# Patient Record
Sex: FEMALE | Race: WHITE | NOT HISPANIC OR LATINO | URBAN - METROPOLITAN AREA
[De-identification: names, ages, dates, MRNs, and addresses within clinical notes are randomized per-mention and may not be internally consistent; named-entity substitution may affect disease eponyms.]

---

## 2021-10-21 ENCOUNTER — *OTHER (OUTPATIENT)
Dept: URBAN - METROPOLITAN AREA 40 | Facility: CLINIC | Age: 71
Setting detail: DERMATOLOGY
End: 2021-10-21

## 2021-10-21 DIAGNOSIS — C44.529 SQUAMOUS CELL CARCINOMA OF SKIN OF OTHER PART OF TRUNK: ICD-10-CM

## 2021-10-21 PROCEDURE — 11102 TANGNTL BX SKIN SINGLE LES: CPT

## 2021-10-21 PROCEDURE — 99213 OFFICE O/P EST LOW 20 MIN: CPT

## 2021-12-15 ENCOUNTER — BCC (OUTPATIENT)
Dept: URBAN - METROPOLITAN AREA CLINIC 27 | Facility: CLINIC | Age: 71
Setting detail: DERMATOLOGY
End: 2021-12-15

## 2021-12-15 DIAGNOSIS — D48.5 NEOPLASM OF UNCERTAIN BEHAVIOR OF SKIN: ICD-10-CM

## 2021-12-15 PROBLEM — C44.719 BASAL CELL CARCINOMA OF SKIN OF LEFT LOWER LIMB, INCLUDING HIP: Status: ACTIVE | Noted: 2021-12-15

## 2021-12-15 PROBLEM — C44.719 BASAL CELL CARCINOMA OF SKIN OF LEFT LOWER LIMB, INCLUDING HIP: Status: RESOLVED | Noted: 2021-12-15

## 2021-12-15 PROCEDURE — 99214 OFFICE O/P EST MOD 30 MIN: CPT

## 2021-12-15 PROCEDURE — 12001 RPR S/N/AX/GEN/TRNK 2.5CM/<: CPT

## 2021-12-15 PROCEDURE — 17313 MOHS 1 STAGE T/A/L: CPT

## 2022-02-16 ENCOUNTER — *OTHER (OUTPATIENT)
Dept: URBAN - METROPOLITAN AREA 40 | Facility: CLINIC | Age: 72
Setting detail: DERMATOLOGY
End: 2022-02-16

## 2022-02-16 ENCOUNTER — RX ONLY (RX ONLY)
Age: 72
End: 2022-02-16

## 2022-02-16 DIAGNOSIS — D04.9 CARCINOMA IN SITU OF SKIN, UNSPECIFIED: ICD-10-CM

## 2022-02-16 DIAGNOSIS — D04.5 CARCINOMA IN SITU OF SKIN OF TRUNK: ICD-10-CM

## 2022-02-16 DIAGNOSIS — C44.511 BASAL CELL CARCINOMA OF SKIN OF BREAST: ICD-10-CM

## 2022-02-16 DIAGNOSIS — C44.510 BASAL CELL CARCINOMA OF ANAL SKIN: ICD-10-CM

## 2022-02-16 DIAGNOSIS — C44.519 BASAL CELL CARCINOMA OF SKIN OF OTHER PART OF TRUNK: ICD-10-CM

## 2022-02-16 DIAGNOSIS — C44.41 BASAL CELL CARCINOMA OF SKIN OF SCALP AND NECK: ICD-10-CM

## 2022-02-16 PROCEDURE — 99214 OFFICE O/P EST MOD 30 MIN: CPT

## 2022-04-14 ENCOUNTER — *OTHER (OUTPATIENT)
Dept: URBAN - METROPOLITAN AREA 40 | Facility: CLINIC | Age: 72
Setting detail: DERMATOLOGY
End: 2022-04-14

## 2022-04-14 DIAGNOSIS — L57.9 SKIN CHANGES DUE TO CHRONIC EXPOSURE TO NONIONIZING RADIATION, UNSPECIFIED: ICD-10-CM

## 2022-04-14 DIAGNOSIS — L90.5 SCAR CONDITIONS AND FIBROSIS OF SKIN: ICD-10-CM

## 2022-04-14 DIAGNOSIS — L82.1 OTHER SEBORRHEIC KERATOSIS: ICD-10-CM

## 2022-04-14 DIAGNOSIS — D23.5 OTHER BENIGN NEOPLASM OF SKIN OF TRUNK: ICD-10-CM

## 2022-04-14 DIAGNOSIS — Z85.820 PERSONAL HISTORY OF MALIGNANT MELANOMA OF SKIN: ICD-10-CM

## 2022-04-14 DIAGNOSIS — Z85.828 PERSONAL HISTORY OF OTHER MALIGNANT NEOPLASM OF SKIN: ICD-10-CM

## 2022-04-14 DIAGNOSIS — D23.39 OTHER BENIGN NEOPLASM OF SKIN OF OTHER PARTS OF FACE: ICD-10-CM

## 2022-04-14 DIAGNOSIS — L81.2 FRECKLES: ICD-10-CM

## 2022-04-14 DIAGNOSIS — D23.4 OTHER BENIGN NEOPLASM OF SKIN OF SCALP AND NECK: ICD-10-CM

## 2022-04-14 PROCEDURE — 99213 OFFICE O/P EST LOW 20 MIN: CPT

## 2022-05-27 ENCOUNTER — W/C (OUTPATIENT)
Dept: URBAN - METROPOLITAN AREA CLINIC 27 | Facility: CLINIC | Age: 72
Setting detail: DERMATOLOGY
End: 2022-05-27

## 2022-05-27 DIAGNOSIS — L57.0 ACTINIC KERATOSIS: ICD-10-CM

## 2022-05-27 PROCEDURE — 99213 OFFICE O/P EST LOW 20 MIN: CPT

## 2022-07-13 ENCOUNTER — APPOINTMENT (OUTPATIENT)
Dept: URBAN - METROPOLITAN AREA CLINIC 194 | Age: 72
Setting detail: DERMATOLOGY
End: 2022-07-13

## 2022-07-13 VITALS — RESPIRATION RATE: 18 BRPM | HEART RATE: 96 BPM | SYSTOLIC BLOOD PRESSURE: 160 MMHG | DIASTOLIC BLOOD PRESSURE: 52 MMHG

## 2022-07-13 VITALS
SYSTOLIC BLOOD PRESSURE: 162 MMHG | DIASTOLIC BLOOD PRESSURE: 42 MMHG | TEMPERATURE: 98.2 F | WEIGHT: 170 LBS | HEIGHT: 65 IN | HEART RATE: 104 BPM | RESPIRATION RATE: 20 BRPM

## 2022-07-13 DIAGNOSIS — Z85.828 PERSONAL HISTORY OF OTHER MALIGNANT NEOPLASM OF SKIN: ICD-10-CM

## 2022-07-13 DIAGNOSIS — Z48.817 ENCOUNTER FOR SURGICAL AFTERCARE FOLLOWING SURGERY ON THE SKIN AND SUBCUTANEOUS TISSUE: ICD-10-CM

## 2022-07-13 DIAGNOSIS — L57.8 OTHER SKIN CHANGES DUE TO CHRONIC EXPOSURE TO NONIONIZING RADIATION: ICD-10-CM

## 2022-07-13 DIAGNOSIS — Z12.83 ENCOUNTER FOR SCREENING FOR MALIGNANT NEOPLASM OF SKIN: ICD-10-CM

## 2022-07-13 PROCEDURE — 99213 OFFICE O/P EST LOW 20 MIN: CPT

## 2022-07-13 PROCEDURE — OTHER MIPS QUALITY: OTHER

## 2022-07-13 PROCEDURE — OTHER COUNSELING: OTHER

## 2022-07-13 PROCEDURE — OTHER SURGICAL DECISION MAKING: OTHER

## 2022-07-13 PROCEDURE — OTHER ASC CONSULTATION: OTHER

## 2022-07-13 NOTE — PROCEDURE: COUNSELING
Detail Level: Detailed
Patient Specific Counseling (Will Not Stick From Patient To Patient): Patient instructed to return to daily bandage changes. Patient is to wear support hoses to help reduce the swelling. Patient should keep leg elevated as much as possible to help with healing.

## 2022-08-26 ENCOUNTER — APPOINTMENT (OUTPATIENT)
Dept: URBAN - METROPOLITAN AREA CLINIC 194 | Age: 72
Setting detail: DERMATOLOGY
End: 2022-08-29

## 2022-08-26 VITALS — HEIGHT: 65 IN | WEIGHT: 170 LBS

## 2022-08-26 VITALS
DIASTOLIC BLOOD PRESSURE: 70 MMHG | RESPIRATION RATE: 18 BRPM | WEIGHT: 170 LBS | TEMPERATURE: 97.2 F | SYSTOLIC BLOOD PRESSURE: 138 MMHG | HEART RATE: 72 BPM | HEIGHT: 65 IN

## 2022-08-26 VITALS — DIASTOLIC BLOOD PRESSURE: 72 MMHG | HEART RATE: 74 BPM | RESPIRATION RATE: 18 BRPM | SYSTOLIC BLOOD PRESSURE: 140 MMHG

## 2022-08-26 DIAGNOSIS — Z85.828 PERSONAL HISTORY OF OTHER MALIGNANT NEOPLASM OF SKIN: ICD-10-CM

## 2022-08-26 DIAGNOSIS — Z48.817 ENCOUNTER FOR SURGICAL AFTERCARE FOLLOWING SURGERY ON THE SKIN AND SUBCUTANEOUS TISSUE: ICD-10-CM

## 2022-08-26 DIAGNOSIS — Z12.83 ENCOUNTER FOR SCREENING FOR MALIGNANT NEOPLASM OF SKIN: ICD-10-CM

## 2022-08-26 PROCEDURE — OTHER ASC CONSULTATION: OTHER

## 2022-08-26 PROCEDURE — OTHER SURGICAL DECISION MAKING: OTHER

## 2022-08-26 PROCEDURE — OTHER PRESCRIPTION: OTHER

## 2022-08-26 PROCEDURE — OTHER MIPS QUALITY: OTHER

## 2022-08-26 PROCEDURE — OTHER COUNSELING: OTHER

## 2022-08-26 PROCEDURE — 99213 OFFICE O/P EST LOW 20 MIN: CPT

## 2022-08-26 RX ORDER — TRIAMCINOLONE ACETONIDE 1 MG/G
OINTMENT TOPICAL
Qty: 15 | Refills: 0 | Status: ACTIVE

## 2022-10-13 ENCOUNTER — APPOINTMENT (OUTPATIENT)
Dept: URBAN - METROPOLITAN AREA CLINIC 199 | Age: 72
Setting detail: DERMATOLOGY
End: 2022-10-27

## 2022-10-13 DIAGNOSIS — D22 MELANOCYTIC NEVI: ICD-10-CM

## 2022-10-13 DIAGNOSIS — Z85.828 PERSONAL HISTORY OF OTHER MALIGNANT NEOPLASM OF SKIN: ICD-10-CM

## 2022-10-13 DIAGNOSIS — L81.4 OTHER MELANIN HYPERPIGMENTATION: ICD-10-CM

## 2022-10-13 DIAGNOSIS — L57.0 ACTINIC KERATOSIS: ICD-10-CM

## 2022-10-13 DIAGNOSIS — Z48.817 ENCOUNTER FOR SURGICAL AFTERCARE FOLLOWING SURGERY ON THE SKIN AND SUBCUTANEOUS TISSUE: ICD-10-CM

## 2022-10-13 DIAGNOSIS — L24 IRRITANT CONTACT DERMATITIS: ICD-10-CM

## 2022-10-13 PROBLEM — L24.9 IRRITANT CONTACT DERMATITIS, UNSPECIFIED CAUSE: Status: ACTIVE | Noted: 2022-10-13

## 2022-10-13 PROBLEM — D22.39 MELANOCYTIC NEVI OF OTHER PARTS OF FACE: Status: ACTIVE | Noted: 2022-10-13

## 2022-10-13 PROCEDURE — OTHER COUNSELING: OTHER

## 2022-10-13 PROCEDURE — OTHER PRESCRIPTION MEDICATION MANAGEMENT: OTHER

## 2022-10-13 PROCEDURE — 99214 OFFICE O/P EST MOD 30 MIN: CPT | Mod: 25

## 2022-10-13 PROCEDURE — OTHER PRESCRIPTION: OTHER

## 2022-10-13 PROCEDURE — 17000 DESTRUCT PREMALG LESION: CPT

## 2022-10-13 PROCEDURE — OTHER LIQUID NITROGEN: OTHER

## 2022-10-13 RX ORDER — MUPIROCIN 20 MG/G
OINTMENT TOPICAL
Qty: 22 | Refills: 2 | Status: ERX | COMMUNITY
Start: 2022-10-13

## 2022-10-13 ASSESSMENT — LOCATION ZONE DERM
LOCATION ZONE: FACE
LOCATION ZONE: LEG

## 2022-10-13 ASSESSMENT — LOCATION DETAILED DESCRIPTION DERM
LOCATION DETAILED: RIGHT CENTRAL MALAR CHEEK
LOCATION DETAILED: LEFT DISTAL PRETIBIAL REGION
LOCATION DETAILED: LEFT INFERIOR CENTRAL MALAR CHEEK
LOCATION DETAILED: LEFT LATERAL MALAR CHEEK
LOCATION DETAILED: RIGHT MEDIAL MALAR CHEEK
LOCATION DETAILED: LEFT MEDIAL MALAR CHEEK
LOCATION DETAILED: LEFT SUPERIOR NASAL CHEEK

## 2022-10-13 ASSESSMENT — LOCATION SIMPLE DESCRIPTION DERM
LOCATION SIMPLE: LEFT CHEEK
LOCATION SIMPLE: RIGHT CHEEK
LOCATION SIMPLE: LEFT PRETIBIAL REGION

## 2022-10-13 NOTE — PROCEDURE: LIQUID NITROGEN
Number Of Freeze-Thaw Cycles: 3 freeze-thaw cycles
Detail Level: Detailed
Render Note In Bullet Format When Appropriate: No
Post-Care Instructions: I reviewed with the patient in detail post-care instructions. Patient is to wear sunprotection, and avoid picking at any of the treated lesions. Pt may apply Vaseline to crusted or scabbing areas.
Duration Of Freeze Thaw-Cycle (Seconds): 5
Show Aperture Variable?: Yes
Consent: The patient's consent was obtained including but not limited to risks of crusting, scabbing, blistering, scarring, darker or lighter pigmentary change, recurrence, incomplete removal and infection.

## 2022-10-13 NOTE — PROCEDURE: PRESCRIPTION MEDICATION MANAGEMENT
Render In Strict Bullet Format?: No
Plan: Ongoing since mohs surgery. Likely ICD v ACD to adhesive bandages\\n-some honey crust on site today, will treat with mupirocin ointment TID x 1 month\\n-reviewed that other than the above, to use only vaseline and non stick dressing over top the entire area\\n-if inflammation not resolved at next visit with conservative measures, will prescribe triam ointment
Detail Level: Zone

## 2022-11-14 ENCOUNTER — APPOINTMENT (OUTPATIENT)
Dept: URBAN - METROPOLITAN AREA CLINIC 199 | Age: 72
Setting detail: DERMATOLOGY
End: 2022-11-14

## 2022-11-14 DIAGNOSIS — L81.4 OTHER MELANIN HYPERPIGMENTATION: ICD-10-CM

## 2022-11-14 DIAGNOSIS — L82.1 OTHER SEBORRHEIC KERATOSIS: ICD-10-CM

## 2022-11-14 DIAGNOSIS — Z85.828 PERSONAL HISTORY OF OTHER MALIGNANT NEOPLASM OF SKIN: ICD-10-CM

## 2022-11-14 DIAGNOSIS — Z48.817 ENCOUNTER FOR SURGICAL AFTERCARE FOLLOWING SURGERY ON THE SKIN AND SUBCUTANEOUS TISSUE: ICD-10-CM

## 2022-11-14 DIAGNOSIS — L24 IRRITANT CONTACT DERMATITIS: ICD-10-CM

## 2022-11-14 PROBLEM — L24.9 IRRITANT CONTACT DERMATITIS, UNSPECIFIED CAUSE: Status: ACTIVE | Noted: 2022-11-14

## 2022-11-14 PROCEDURE — 99214 OFFICE O/P EST MOD 30 MIN: CPT

## 2022-11-14 PROCEDURE — OTHER COUNSELING: OTHER

## 2022-11-14 PROCEDURE — OTHER PRESCRIPTION MEDICATION MANAGEMENT: OTHER

## 2022-11-14 PROCEDURE — OTHER PRESCRIPTION: OTHER

## 2022-11-14 RX ORDER — CLOBETASOL PROPIONATE 0.5 MG/G
OINTMENT TOPICAL
Qty: 60 | Refills: 0 | Status: ERX | COMMUNITY
Start: 2022-11-14

## 2022-11-14 ASSESSMENT — LOCATION DETAILED DESCRIPTION DERM
LOCATION DETAILED: RIGHT CENTRAL MALAR CHEEK
LOCATION DETAILED: LEFT DISTAL PRETIBIAL REGION
LOCATION DETAILED: NASAL DORSUM
LOCATION DETAILED: LEFT INFERIOR CENTRAL MALAR CHEEK

## 2022-11-14 ASSESSMENT — LOCATION SIMPLE DESCRIPTION DERM
LOCATION SIMPLE: LEFT CHEEK
LOCATION SIMPLE: LEFT PRETIBIAL REGION
LOCATION SIMPLE: NOSE
LOCATION SIMPLE: RIGHT CHEEK

## 2022-11-14 ASSESSMENT — LOCATION ZONE DERM
LOCATION ZONE: LEG
LOCATION ZONE: FACE
LOCATION ZONE: NOSE

## 2022-11-14 NOTE — PROCEDURE: PRESCRIPTION MEDICATION MANAGEMENT
Detail Level: Zone
Render In Strict Bullet Format?: No
Plan: Surrounding prior mohs site. Mohs site itself is healing well but there is erythema and scale surrounding the site. Appears slightly less inflamed than last month but this has been occurring x 11 months. She continues to use vaseline under non-stick pad (looked at her wound care materials today)\\n-advised clobetasol ointment BID x 2 weeks\\n-rtc 2 weeks for eval

## 2022-11-30 ENCOUNTER — APPOINTMENT (OUTPATIENT)
Dept: URBAN - METROPOLITAN AREA CLINIC 199 | Age: 72
Setting detail: DERMATOLOGY
End: 2022-11-30

## 2022-11-30 DIAGNOSIS — Z48.817 ENCOUNTER FOR SURGICAL AFTERCARE FOLLOWING SURGERY ON THE SKIN AND SUBCUTANEOUS TISSUE: ICD-10-CM

## 2022-11-30 DIAGNOSIS — L24 IRRITANT CONTACT DERMATITIS: ICD-10-CM

## 2022-11-30 DIAGNOSIS — Z85.828 PERSONAL HISTORY OF OTHER MALIGNANT NEOPLASM OF SKIN: ICD-10-CM

## 2022-11-30 PROBLEM — L24.9 IRRITANT CONTACT DERMATITIS, UNSPECIFIED CAUSE: Status: ACTIVE | Noted: 2022-11-30

## 2022-11-30 PROCEDURE — OTHER PRESCRIPTION MEDICATION MANAGEMENT: OTHER

## 2022-11-30 PROCEDURE — 99213 OFFICE O/P EST LOW 20 MIN: CPT

## 2022-11-30 PROCEDURE — OTHER COUNSELING: OTHER

## 2022-11-30 ASSESSMENT — LOCATION SIMPLE DESCRIPTION DERM: LOCATION SIMPLE: LEFT PRETIBIAL REGION

## 2022-11-30 ASSESSMENT — LOCATION DETAILED DESCRIPTION DERM: LOCATION DETAILED: LEFT DISTAL PRETIBIAL REGION

## 2022-11-30 ASSESSMENT — LOCATION ZONE DERM: LOCATION ZONE: LEG

## 2022-11-30 NOTE — PROCEDURE: PRESCRIPTION MEDICATION MANAGEMENT
Render In Strict Bullet Format?: No
Plan: Improving significantly after many months of erythema and irritation, likely 2/2 bandages\\n-pause clobetasol, cont vaseline\\n-can use clobetasol as spot treatment on the small red papules that come and go
Detail Level: Zone

## 2023-01-04 ENCOUNTER — APPOINTMENT (OUTPATIENT)
Dept: URBAN - METROPOLITAN AREA CLINIC 199 | Age: 73
Setting detail: DERMATOLOGY
End: 2023-01-04

## 2023-01-04 DIAGNOSIS — Z85.828 PERSONAL HISTORY OF OTHER MALIGNANT NEOPLASM OF SKIN: ICD-10-CM

## 2023-01-04 DIAGNOSIS — L30.1 DYSHIDROSIS [POMPHOLYX]: ICD-10-CM

## 2023-01-04 DIAGNOSIS — L57.0 ACTINIC KERATOSIS: ICD-10-CM

## 2023-01-04 DIAGNOSIS — Z48.817 ENCOUNTER FOR SURGICAL AFTERCARE FOLLOWING SURGERY ON THE SKIN AND SUBCUTANEOUS TISSUE: ICD-10-CM

## 2023-01-04 DIAGNOSIS — L24 IRRITANT CONTACT DERMATITIS: ICD-10-CM

## 2023-01-04 PROBLEM — L24.9 IRRITANT CONTACT DERMATITIS, UNSPECIFIED CAUSE: Status: ACTIVE | Noted: 2023-01-04

## 2023-01-04 PROCEDURE — 17000 DESTRUCT PREMALG LESION: CPT

## 2023-01-04 PROCEDURE — OTHER PRESCRIPTION: OTHER

## 2023-01-04 PROCEDURE — OTHER COUNSELING: OTHER

## 2023-01-04 PROCEDURE — 99214 OFFICE O/P EST MOD 30 MIN: CPT | Mod: 25

## 2023-01-04 PROCEDURE — OTHER PRESCRIPTION MEDICATION MANAGEMENT: OTHER

## 2023-01-04 PROCEDURE — OTHER LIQUID NITROGEN: OTHER

## 2023-01-04 RX ORDER — MOMETASONE FUROATE 1 MG/G
CREAM TOPICAL
Qty: 45 | Refills: 2 | Status: ERX | COMMUNITY
Start: 2023-01-04

## 2023-01-04 ASSESSMENT — LOCATION ZONE DERM
LOCATION ZONE: FINGER
LOCATION ZONE: LEG
LOCATION ZONE: FACE

## 2023-01-04 ASSESSMENT — LOCATION DETAILED DESCRIPTION DERM
LOCATION DETAILED: LEFT DISTAL PRETIBIAL REGION
LOCATION DETAILED: LEFT INDEX FINGER PROXIMAL INTERPHALANGEAL JOINT
LOCATION DETAILED: RIGHT CENTRAL MALAR CHEEK

## 2023-01-04 ASSESSMENT — LOCATION SIMPLE DESCRIPTION DERM
LOCATION SIMPLE: RIGHT CHEEK
LOCATION SIMPLE: LEFT PRETIBIAL REGION
LOCATION SIMPLE: LEFT INDEX FINGER

## 2023-01-04 NOTE — PROCEDURE: LIQUID NITROGEN
Show Aperture Variable?: Yes
Render Note In Bullet Format When Appropriate: No
Duration Of Freeze Thaw-Cycle (Seconds): 5
Post-Care Instructions: I reviewed with the patient in detail post-care instructions. Patient is to wear sunprotection, and avoid picking at any of the treated lesions. Pt may apply Vaseline to crusted or scabbing areas.
Detail Level: Detailed
Number Of Freeze-Thaw Cycles: 3 freeze-thaw cycles
Consent: The patient's consent was obtained including but not limited to risks of crusting, scabbing, blistering, scarring, darker or lighter pigmentary change, recurrence, incomplete removal and infection.

## 2023-01-04 NOTE — PROCEDURE: COUNSELING
Detail Level: Detailed
Patient Specific Counseling (Will Not Stick From Patient To Patient): Nearly completely healed! Great progress. Cont moisturization with vaseline/moisturizer but otherwise does not need to keep bandage over site (this was causing her ICD/ACD)
Patient Specific Counseling (Will Not Stick From Patient To Patient): Resolved! At last visit was told to stop clobetasol and cont only vaseline. There is PIH from prior inflammation but no active rash today. Cont conservative care with vaseline/moisturization. No need for topical steroids

## 2023-01-04 NOTE — PROCEDURE: PRESCRIPTION MEDICATION MANAGEMENT
Plan: Flaring now; always flares in the winter. She likes mometasone\\n-start mometasone cream BID PRN flares
Render In Strict Bullet Format?: No
Detail Level: Zone

## 2023-05-31 ENCOUNTER — APPOINTMENT (OUTPATIENT)
Dept: URBAN - METROPOLITAN AREA CLINIC 199 | Age: 73
Setting detail: DERMATOLOGY
End: 2023-05-31

## 2023-05-31 DIAGNOSIS — L81.4 OTHER MELANIN HYPERPIGMENTATION: ICD-10-CM

## 2023-05-31 DIAGNOSIS — D485 NEOPLASM OF UNCERTAIN BEHAVIOR OF SKIN: ICD-10-CM

## 2023-05-31 DIAGNOSIS — L82.1 OTHER SEBORRHEIC KERATOSIS: ICD-10-CM

## 2023-05-31 DIAGNOSIS — D22 MELANOCYTIC NEVI: ICD-10-CM

## 2023-05-31 DIAGNOSIS — D18.0 HEMANGIOMA: ICD-10-CM

## 2023-05-31 DIAGNOSIS — Z85.828 PERSONAL HISTORY OF OTHER MALIGNANT NEOPLASM OF SKIN: ICD-10-CM

## 2023-05-31 DIAGNOSIS — T07XXXA ABRASION OR FRICTION BURN OF OTHER, MULTIPLE, AND UNSPECIFIED SITES, WITHOUT MENTION OF INFECTION: ICD-10-CM

## 2023-05-31 PROBLEM — D22.39 MELANOCYTIC NEVI OF OTHER PARTS OF FACE: Status: ACTIVE | Noted: 2023-05-31

## 2023-05-31 PROBLEM — D48.5 NEOPLASM OF UNCERTAIN BEHAVIOR OF SKIN: Status: ACTIVE | Noted: 2023-05-31

## 2023-05-31 PROBLEM — S00.402A UNSPECIFIED SUPERFICIAL INJURY OF LEFT EAR, INITIAL ENCOUNTER: Status: ACTIVE | Noted: 2023-05-31

## 2023-05-31 PROBLEM — D22.5 MELANOCYTIC NEVI OF TRUNK: Status: ACTIVE | Noted: 2023-05-31

## 2023-05-31 PROBLEM — D18.01 HEMANGIOMA OF SKIN AND SUBCUTANEOUS TISSUE: Status: ACTIVE | Noted: 2023-05-31

## 2023-05-31 PROCEDURE — OTHER COUNSELING: OTHER

## 2023-05-31 PROCEDURE — 11102 TANGNTL BX SKIN SINGLE LES: CPT

## 2023-05-31 PROCEDURE — 99213 OFFICE O/P EST LOW 20 MIN: CPT | Mod: 25

## 2023-05-31 PROCEDURE — OTHER BIOPSY BY SHAVE METHOD: OTHER

## 2023-05-31 ASSESSMENT — LOCATION DETAILED DESCRIPTION DERM
LOCATION DETAILED: RIGHT MEDIAL BREAST 1-2:00 REGION
LOCATION DETAILED: LEFT SUPERIOR LATERAL LOWER BACK
LOCATION DETAILED: RIGHT MEDIAL SUPERIOR CHEST
LOCATION DETAILED: LEFT ANTERIOR PROXIMAL THIGH
LOCATION DETAILED: RIGHT SUPERIOR UPPER BACK
LOCATION DETAILED: LEFT INFERIOR LATERAL LOWER BACK
LOCATION DETAILED: RIGHT LATERAL ABDOMEN
LOCATION DETAILED: LEFT SUPERIOR UPPER BACK
LOCATION DETAILED: LEFT LATERAL MALAR CHEEK
LOCATION DETAILED: NASAL DORSUM
LOCATION DETAILED: INFERIOR MID FOREHEAD
LOCATION DETAILED: LEFT SUPERIOR HELIX
LOCATION DETAILED: RIGHT INFERIOR FOREHEAD
LOCATION DETAILED: LEFT INFERIOR LATERAL NECK
LOCATION DETAILED: RIGHT MEDIAL MALAR CHEEK
LOCATION DETAILED: LEFT INFERIOR LATERAL MIDBACK
LOCATION DETAILED: RIGHT SUPERIOR LATERAL MIDBACK
LOCATION DETAILED: LEFT SUPERIOR CENTRAL MALAR CHEEK
LOCATION DETAILED: LEFT POSTERIOR SHOULDER
LOCATION DETAILED: RIGHT CLAVICULAR NECK
LOCATION DETAILED: SUPERIOR LUMBAR SPINE
LOCATION DETAILED: LEFT MEDIAL MALAR CHEEK
LOCATION DETAILED: LEFT DISTAL PRETIBIAL REGION
LOCATION DETAILED: LEFT INFERIOR CENTRAL MALAR CHEEK
LOCATION DETAILED: LEFT INFERIOR UPPER BACK
LOCATION DETAILED: EPIGASTRIC SKIN
LOCATION DETAILED: RIGHT SUPERIOR CENTRAL MALAR CHEEK
LOCATION DETAILED: LEFT MID-UPPER BACK
LOCATION DETAILED: RIGHT SUPERIOR ANTERIOR NECK
LOCATION DETAILED: LEFT LATERAL ABDOMEN
LOCATION DETAILED: RIGHT CENTRAL MALAR CHEEK
LOCATION DETAILED: RIGHT BUTTOCK
LOCATION DETAILED: LEFT FOREHEAD
LOCATION DETAILED: LEFT LATERAL FOREHEAD

## 2023-05-31 ASSESSMENT — LOCATION ZONE DERM
LOCATION ZONE: ARM
LOCATION ZONE: NOSE
LOCATION ZONE: FACE
LOCATION ZONE: TRUNK
LOCATION ZONE: LEG
LOCATION ZONE: NECK
LOCATION ZONE: EAR

## 2023-05-31 ASSESSMENT — LOCATION SIMPLE DESCRIPTION DERM
LOCATION SIMPLE: LEFT SHOULDER
LOCATION SIMPLE: LOWER BACK
LOCATION SIMPLE: LEFT THIGH
LOCATION SIMPLE: RIGHT BREAST
LOCATION SIMPLE: INFERIOR FOREHEAD
LOCATION SIMPLE: LEFT PRETIBIAL REGION
LOCATION SIMPLE: LEFT LOWER BACK
LOCATION SIMPLE: CHEST
LOCATION SIMPLE: RIGHT CHEEK
LOCATION SIMPLE: RIGHT ANTERIOR NECK
LOCATION SIMPLE: LEFT CHEEK
LOCATION SIMPLE: LEFT EAR
LOCATION SIMPLE: LEFT FOREHEAD
LOCATION SIMPLE: RIGHT UPPER BACK
LOCATION SIMPLE: RIGHT LOWER BACK
LOCATION SIMPLE: RIGHT BUTTOCK
LOCATION SIMPLE: LEFT ANTERIOR NECK
LOCATION SIMPLE: RIGHT FOREHEAD
LOCATION SIMPLE: LEFT UPPER BACK
LOCATION SIMPLE: ABDOMEN
LOCATION SIMPLE: NOSE

## 2023-05-31 NOTE — PROCEDURE: BIOPSY BY SHAVE METHOD
Problem: Pain - Standard  Goal: Alleviation of pain or a reduction in pain to the patient’s comfort goal  Outcome: Progressing     Problem: Knowledge Deficit - Standard  Goal: Patient and family/care givers will demonstrate understanding of plan of care, disease process/condition, diagnostic tests and medications  Outcome: Progressing   The patient is Stable - Low risk of patient condition declining or worsening    Shift Goals  Clinical Goals: control pain  Patient Goals: rest    Progress made toward(s) clinical / shift goals:  Patient is at comfort goal, patient feeling less nausea.     Patient is not progressing towards the following goals:       Dressing: bandage

## 2023-06-08 ENCOUNTER — APPOINTMENT (OUTPATIENT)
Dept: URBAN - METROPOLITAN AREA CLINIC 199 | Age: 73
Setting detail: DERMATOLOGY
End: 2023-06-08

## 2023-06-08 DIAGNOSIS — L70.0 ACNE VULGARIS: ICD-10-CM

## 2023-06-08 DIAGNOSIS — L30.4 ERYTHEMA INTERTRIGO: ICD-10-CM

## 2023-06-08 DIAGNOSIS — Z85.828 PERSONAL HISTORY OF OTHER MALIGNANT NEOPLASM OF SKIN: ICD-10-CM

## 2023-06-08 PROBLEM — C44.519 BASAL CELL CARCINOMA OF SKIN OF OTHER PART OF TRUNK: Status: ACTIVE | Noted: 2023-06-08

## 2023-06-08 PROCEDURE — OTHER CURETTAGE AND DESTRUCTION: OTHER

## 2023-06-08 PROCEDURE — 17261 DSTRJ MAL LES T/A/L .6-1.0CM: CPT

## 2023-06-08 PROCEDURE — OTHER COUNSELING: OTHER

## 2023-06-08 PROCEDURE — OTHER MIPS QUALITY: OTHER

## 2023-06-08 PROCEDURE — 99213 OFFICE O/P EST LOW 20 MIN: CPT | Mod: 25

## 2023-06-08 ASSESSMENT — LOCATION SIMPLE DESCRIPTION DERM
LOCATION SIMPLE: RIGHT EYEBROW
LOCATION SIMPLE: RIGHT BREAST
LOCATION SIMPLE: LEFT PRETIBIAL REGION
LOCATION SIMPLE: CHIN
LOCATION SIMPLE: ABDOMEN

## 2023-06-08 ASSESSMENT — LOCATION DETAILED DESCRIPTION DERM
LOCATION DETAILED: LEFT RIB CAGE
LOCATION DETAILED: RIGHT CENTRAL EYEBROW
LOCATION DETAILED: LEFT CHIN
LOCATION DETAILED: LEFT PROXIMAL PRETIBIAL REGION
LOCATION DETAILED: RIGHT MEDIAL BREAST 5-6:00 REGION

## 2023-06-08 ASSESSMENT — LOCATION ZONE DERM
LOCATION ZONE: LEG
LOCATION ZONE: TRUNK
LOCATION ZONE: FACE

## 2023-06-08 NOTE — PROCEDURE: CURETTAGE AND DESTRUCTION

## 2023-06-08 NOTE — PROCEDURE: COUNSELING
Detail Level: Detailed
Patient Specific Counseling (Will Not Stick From Patient To Patient): Advised zeasorb powder
Birth Control Pills Pregnancy And Lactation Text: This medication should be avoided if pregnant and for the first 30 days post-partum.
Doxycycline Pregnancy And Lactation Text: This medication is Pregnancy Category D and not consider safe during pregnancy. It is also excreted in breast milk but is considered safe for shorter treatment courses.
Azelaic Acid Counseling: Patient counseled that medicine may cause skin irritation and to avoid applying near the eyes.  In the event of skin irritation, the patient was advised to reduce the amount of the drug applied or use it less frequently.   The patient verbalized understanding of the proper use and possible adverse effects of azelaic acid.  All of the patient's questions and concerns were addressed.
Aklief counseling:  Patient advised to apply a pea-sized amount only at bedtime and wait 30 minutes after washing their face before applying.  If too drying, patient may add a non-comedogenic moisturizer.  The most commonly reported side effects including irritation, redness, scaling, dryness, stinging, burning, itching, and increased risk of sunburn.  The patient verbalized understanding of the proper use and possible adverse effects of retinoids.  All of the patient's questions and concerns were addressed.
Tetracycline Counseling: Patient counseled regarding possible photosensitivity and increased risk for sunburn.  Patient instructed to avoid sunlight, if possible.  When exposed to sunlight, patients should wear protective clothing, sunglasses, and sunscreen.  The patient was instructed to call the office immediately if the following severe adverse effects occur:  hearing changes, easy bruising/bleeding, severe headache, or vision changes.  The patient verbalized understanding of the proper use and possible adverse effects of tetracycline.  All of the patient's questions and concerns were addressed. Patient understands to avoid pregnancy while on therapy due to potential birth defects.
Bactrim Pregnancy And Lactation Text: This medication is Pregnancy Category D and is known to cause fetal risk.  It is also excreted in breast milk.
Spironolactone Counseling: Patient advised regarding risks of diarrhea, abdominal pain, hyperkalemia, birth defects (for female patients), liver toxicity and renal toxicity. The patient may need blood work to monitor liver and kidney function and potassium levels while on therapy. The patient verbalized understanding of the proper use and possible adverse effects of spironolactone.  All of the patient's questions and concerns were addressed.
Azithromycin Pregnancy And Lactation Text: This medication is considered safe during pregnancy and is also secreted in breast milk.
Topical Sulfur Applications Pregnancy And Lactation Text: This medication is Pregnancy Category C and has an unknown safety profile during pregnancy. It is unknown if this topical medication is excreted in breast milk.
Include Pregnancy/Lactation Warning?: No
Patient Specific Counseling (Will Not Stick From Patient To Patient): Bothered by comedonjohn\\n-start adapalene nightly
Tazorac Pregnancy And Lactation Text: This medication is not safe during pregnancy. It is unknown if this medication is excreted in breast milk.
Topical Clindamycin Pregnancy And Lactation Text: This medication is Pregnancy Category B and is considered safe during pregnancy. It is unknown if it is excreted in breast milk.
Sarecycline Counseling: Patient advised regarding possible photosensitivity and discoloration of the teeth, skin, lips, tongue and gums.  Patient instructed to avoid sunlight, if possible.  When exposed to sunlight, patients should wear protective clothing, sunglasses, and sunscreen.  The patient was instructed to call the office immediately if the following severe adverse effects occur:  hearing changes, easy bruising/bleeding, severe headache, or vision changes.  The patient verbalized understanding of the proper use and possible adverse effects of sarecycline.  All of the patient's questions and concerns were addressed.
Winlevi Pregnancy And Lactation Text: This medication is considered safe during pregnancy and breastfeeding.
Erythromycin Counseling:  I discussed with the patient the risks of erythromycin including but not limited to GI upset, allergic reaction, drug rash, diarrhea, increase in liver enzymes, and yeast infections.
Topical Retinoid Pregnancy And Lactation Text: This medication is Pregnancy Category C. It is unknown if this medication is excreted in breast milk.
Minocycline Counseling: Patient advised regarding possible photosensitivity and discoloration of the teeth, skin, lips, tongue and gums.  Patient instructed to avoid sunlight, if possible.  When exposed to sunlight, patients should wear protective clothing, sunglasses, and sunscreen.  The patient was instructed to call the office immediately if the following severe adverse effects occur:  hearing changes, easy bruising/bleeding, severe headache, or vision changes.  The patient verbalized understanding of the proper use and possible adverse effects of minocycline.  All of the patient's questions and concerns were addressed.
High Dose Vitamin A Counseling: Side effects reviewed, pt to contact office should one occur.
Benzoyl Peroxide Pregnancy And Lactation Text: This medication is Pregnancy Category C. It is unknown if benzoyl peroxide is excreted in breast milk.
Doxycycline Counseling:  Patient counseled regarding possible photosensitivity and increased risk for sunburn.  Patient instructed to avoid sunlight, if possible.  When exposed to sunlight, patients should wear protective clothing, sunglasses, and sunscreen.  The patient was instructed to call the office immediately if the following severe adverse effects occur:  hearing changes, easy bruising/bleeding, severe headache, or vision changes.  The patient verbalized understanding of the proper use and possible adverse effects of doxycycline.  All of the patient's questions and concerns were addressed.
Isotretinoin Counseling: Patient should get monthly blood tests, not donate blood, not drive at night if vision affected, not share medication, and not undergo elective surgery for 6 months after tx completed. Side effects reviewed, pt to contact office should one occur.
Azelaic Acid Pregnancy And Lactation Text: This medication is considered safe during pregnancy and breast feeding.
Aklief Pregnancy And Lactation Text: It is unknown if this medication is safe to use during pregnancy.  It is unknown if this medication is excreted in breast milk.  Breastfeeding women should use the topical cream on the smallest area of the skin for the shortest time needed while breastfeeding.  Do not apply to nipple and areola.
Dapsone Counseling: I discussed with the patient the risks of dapsone including but not limited to hemolytic anemia, agranulocytosis, rashes, methemoglobinemia, kidney failure, peripheral neuropathy, headaches, GI upset, and liver toxicity.  Patients who start dapsone require monitoring including baseline LFTs and weekly CBCs for the first month, then every month thereafter.  The patient verbalized understanding of the proper use and possible adverse effects of dapsone.  All of the patient's questions and concerns were addressed.
Tetracycline Pregnancy And Lactation Text: This medication is Pregnancy Category D and not consider safe during pregnancy. It is also excreted in breast milk.
Birth Control Pills Counseling: Birth Control Pill Counseling: I discussed with the patient the potential side effects of OCPs including but not limited to increased risk of stroke, heart attack, thrombophlebitis, deep venous thrombosis, hepatic adenomas, breast changes, GI upset, headaches, and depression.  The patient verbalized understanding of the proper use and possible adverse effects of OCPs. All of the patient's questions and concerns were addressed.
Bactrim Counseling:  I discussed with the patient the risks of sulfa antibiotics including but not limited to GI upset, allergic reaction, drug rash, diarrhea, dizziness, photosensitivity, and yeast infections.  Rarely, more serious reactions can occur including but not limited to aplastic anemia, agranulocytosis, methemoglobinemia, blood dyscrasias, liver or kidney failure, lung infiltrates or desquamative/blistering drug rashes.
Azithromycin Counseling:  I discussed with the patient the risks of azithromycin including but not limited to GI upset, allergic reaction, drug rash, diarrhea, and yeast infections.
Winlevi Counseling:  I discussed with the patient the risks of topical clascoterone including but not limited to erythema, scaling, itching, and stinging. Patient voiced their understanding.
Spironolactone Pregnancy And Lactation Text: This medication can cause feminization of the male fetus and should be avoided during pregnancy. The active metabolite is also found in breast milk.
Topical Clindamycin Counseling: Patient counseled that this medication may cause skin irritation or allergic reactions.  In the event of skin irritation, the patient was advised to reduce the amount of the drug applied or use it less frequently.   The patient verbalized understanding of the proper use and possible adverse effects of clindamycin.  All of the patient's questions and concerns were addressed.
Topical Retinoid counseling:  Patient advised to apply a pea-sized amount only at bedtime and wait 30 minutes after washing their face before applying.  If too drying, patient may add a non-comedogenic moisturizer. The patient verbalized understanding of the proper use and possible adverse effects of retinoids.  All of the patient's questions and concerns were addressed.
Tazorac Counseling:  Patient advised that medication is irritating and drying.  Patient may need to apply sparingly and wash off after an hour before eventually leaving it on overnight.  The patient verbalized understanding of the proper use and possible adverse effects of tazorac.  All of the patient's questions and concerns were addressed.
Erythromycin Pregnancy And Lactation Text: This medication is Pregnancy Category B and is considered safe during pregnancy. It is also excreted in breast milk.
High Dose Vitamin A Pregnancy And Lactation Text: High dose vitamin A therapy is contraindicated during pregnancy and breast feeding.
Topical Sulfur Applications Counseling: Topical Sulfur Counseling: Patient counseled that this medication may cause skin irritation or allergic reactions.  In the event of skin irritation, the patient was advised to reduce the amount of the drug applied or use it less frequently.   The patient verbalized understanding of the proper use and possible adverse effects of topical sulfur application.  All of the patient's questions and concerns were addressed.
Dapsone Pregnancy And Lactation Text: This medication is Pregnancy Category C and is not considered safe during pregnancy or breast feeding.
Benzoyl Peroxide Counseling: Patient counseled that medicine may cause skin irritation and bleach clothing.  In the event of skin irritation, the patient was advised to reduce the amount of the drug applied or use it less frequently.   The patient verbalized understanding of the proper use and possible adverse effects of benzoyl peroxide.  All of the patient's questions and concerns were addressed.
Isotretinoin Pregnancy And Lactation Text: This medication is Pregnancy Category X and is considered extremely dangerous during pregnancy. It is unknown if it is excreted in breast milk.

## 2023-11-30 ENCOUNTER — APPOINTMENT (OUTPATIENT)
Dept: URBAN - METROPOLITAN AREA CLINIC 199 | Age: 73
Setting detail: DERMATOLOGY
End: 2023-11-30

## 2023-11-30 DIAGNOSIS — D22 MELANOCYTIC NEVI: ICD-10-CM

## 2023-11-30 DIAGNOSIS — L57.8 OTHER SKIN CHANGES DUE TO CHRONIC EXPOSURE TO NONIONIZING RADIATION: ICD-10-CM

## 2023-11-30 DIAGNOSIS — Z85.828 PERSONAL HISTORY OF OTHER MALIGNANT NEOPLASM OF SKIN: ICD-10-CM

## 2023-11-30 DIAGNOSIS — L82.1 OTHER SEBORRHEIC KERATOSIS: ICD-10-CM

## 2023-11-30 DIAGNOSIS — L81.0 POSTINFLAMMATORY HYPERPIGMENTATION: ICD-10-CM

## 2023-11-30 DIAGNOSIS — D18.0 HEMANGIOMA: ICD-10-CM

## 2023-11-30 DIAGNOSIS — T07XXXA INSECT BITE, NONVENOMOUS, OF OTHER, MULTIPLE, AND UNSPECIFIED SITES, WITHOUT MENTION OF INFECTION: ICD-10-CM

## 2023-11-30 DIAGNOSIS — L81.4 OTHER MELANIN HYPERPIGMENTATION: ICD-10-CM

## 2023-11-30 PROBLEM — S20.369A INSECT BITE (NONVENOMOUS) OF UNSPECIFIED FRONT WALL OF THORAX, INITIAL ENCOUNTER: Status: ACTIVE | Noted: 2023-11-30

## 2023-11-30 PROBLEM — D22.5 MELANOCYTIC NEVI OF TRUNK: Status: ACTIVE | Noted: 2023-11-30

## 2023-11-30 PROBLEM — D22.39 MELANOCYTIC NEVI OF OTHER PARTS OF FACE: Status: ACTIVE | Noted: 2023-11-30

## 2023-11-30 PROBLEM — D18.01 HEMANGIOMA OF SKIN AND SUBCUTANEOUS TISSUE: Status: ACTIVE | Noted: 2023-11-30

## 2023-11-30 PROBLEM — D22.72 MELANOCYTIC NEVI OF LEFT LOWER LIMB, INCLUDING HIP: Status: ACTIVE | Noted: 2023-11-30

## 2023-11-30 PROCEDURE — OTHER PRESCRIPTION: OTHER

## 2023-11-30 PROCEDURE — OTHER COUNSELING: OTHER

## 2023-11-30 PROCEDURE — OTHER PRESCRIPTION MEDICATION MANAGEMENT: OTHER

## 2023-11-30 PROCEDURE — 99214 OFFICE O/P EST MOD 30 MIN: CPT

## 2023-11-30 RX ORDER — FLUOROURACIL 2 G/40G
CREAM TOPICAL BID
Qty: 40 | Refills: 1 | Status: ERX | COMMUNITY
Start: 2023-11-30

## 2023-11-30 ASSESSMENT — LOCATION DETAILED DESCRIPTION DERM
LOCATION DETAILED: UPPER STERNUM
LOCATION DETAILED: RIGHT LATERAL ABDOMEN
LOCATION DETAILED: RIGHT CLAVICULAR NECK
LOCATION DETAILED: LEFT INFERIOR CENTRAL MALAR CHEEK
LOCATION DETAILED: LEFT INFERIOR LATERAL LOWER BACK
LOCATION DETAILED: LEFT INFERIOR UPPER BACK
LOCATION DETAILED: LEFT LATERAL FOREHEAD
LOCATION DETAILED: LEFT MID-UPPER BACK
LOCATION DETAILED: RIGHT SUPERIOR CENTRAL MALAR CHEEK
LOCATION DETAILED: LEFT FOREHEAD
LOCATION DETAILED: RIGHT SUPERIOR UPPER BACK
LOCATION DETAILED: SUPERIOR LUMBAR SPINE
LOCATION DETAILED: RIGHT BUTTOCK
LOCATION DETAILED: RIGHT SUPERIOR ANTERIOR NECK
LOCATION DETAILED: RIGHT CENTRAL MALAR CHEEK
LOCATION DETAILED: RIGHT MEDIAL SUPERIOR CHEST
LOCATION DETAILED: RIGHT SUPERIOR LATERAL MIDBACK
LOCATION DETAILED: LEFT MEDIAL MALAR CHEEK
LOCATION DETAILED: NASAL DORSUM
LOCATION DETAILED: RIGHT UPPER CUTANEOUS LIP
LOCATION DETAILED: LEFT DORSAL FOOT
LOCATION DETAILED: LEFT INFERIOR LATERAL MIDBACK
LOCATION DETAILED: LEFT LATERAL ABDOMEN
LOCATION DETAILED: LEFT LATERAL MALAR CHEEK
LOCATION DETAILED: RIGHT MEDIAL MALAR CHEEK
LOCATION DETAILED: RIGHT INFERIOR FOREHEAD
LOCATION DETAILED: RIGHT MEDIAL BREAST 1-2:00 REGION
LOCATION DETAILED: LEFT SUPERIOR CENTRAL MALAR CHEEK
LOCATION DETAILED: LEFT POSTERIOR SHOULDER
LOCATION DETAILED: EPIGASTRIC SKIN
LOCATION DETAILED: LEFT INFERIOR LATERAL NECK
LOCATION DETAILED: INFERIOR MID FOREHEAD
LOCATION DETAILED: RIGHT ANTERIOR PROXIMAL THIGH
LOCATION DETAILED: LEFT ANTERIOR PROXIMAL THIGH
LOCATION DETAILED: LEFT DISTAL PRETIBIAL REGION
LOCATION DETAILED: LEFT SUPERIOR UPPER BACK
LOCATION DETAILED: LEFT SUPERIOR LATERAL LOWER BACK

## 2023-11-30 ASSESSMENT — LOCATION SIMPLE DESCRIPTION DERM
LOCATION SIMPLE: ABDOMEN
LOCATION SIMPLE: RIGHT LIP
LOCATION SIMPLE: RIGHT BUTTOCK
LOCATION SIMPLE: NOSE
LOCATION SIMPLE: LEFT SHOULDER
LOCATION SIMPLE: LEFT LOWER BACK
LOCATION SIMPLE: LEFT UPPER BACK
LOCATION SIMPLE: LEFT PRETIBIAL REGION
LOCATION SIMPLE: LEFT CHEEK
LOCATION SIMPLE: RIGHT ANTERIOR NECK
LOCATION SIMPLE: CHEST
LOCATION SIMPLE: RIGHT THIGH
LOCATION SIMPLE: RIGHT FOREHEAD
LOCATION SIMPLE: LEFT ANTERIOR NECK
LOCATION SIMPLE: RIGHT LOWER BACK
LOCATION SIMPLE: LEFT THIGH
LOCATION SIMPLE: RIGHT CHEEK
LOCATION SIMPLE: RIGHT UPPER BACK
LOCATION SIMPLE: LEFT FOOT
LOCATION SIMPLE: LOWER BACK
LOCATION SIMPLE: INFERIOR FOREHEAD
LOCATION SIMPLE: LEFT FOREHEAD
LOCATION SIMPLE: RIGHT BREAST

## 2023-11-30 ASSESSMENT — LOCATION ZONE DERM
LOCATION ZONE: ARM
LOCATION ZONE: FEET
LOCATION ZONE: FACE
LOCATION ZONE: TRUNK
LOCATION ZONE: LEG
LOCATION ZONE: NECK
LOCATION ZONE: LIP
LOCATION ZONE: NOSE

## 2023-11-30 NOTE — PROCEDURE: PRESCRIPTION MEDICATION MANAGEMENT
Render In Strict Bullet Format?: No
Detail Level: Zone
Plan: Upper cutaneous lip and nose\\n-start efudex BID x 1-2 weeks, s/e reviewed, hand out provided

## 2024-06-26 ENCOUNTER — APPOINTMENT (OUTPATIENT)
Dept: URBAN - METROPOLITAN AREA CLINIC 199 | Age: 74
Setting detail: DERMATOLOGY
End: 2024-06-26

## 2024-06-26 DIAGNOSIS — L81.4 OTHER MELANIN HYPERPIGMENTATION: ICD-10-CM

## 2024-06-26 DIAGNOSIS — D22 MELANOCYTIC NEVI: ICD-10-CM

## 2024-06-26 DIAGNOSIS — L21.8 OTHER SEBORRHEIC DERMATITIS: ICD-10-CM

## 2024-06-26 DIAGNOSIS — D18.0 HEMANGIOMA: ICD-10-CM

## 2024-06-26 DIAGNOSIS — L82.1 OTHER SEBORRHEIC KERATOSIS: ICD-10-CM

## 2024-06-26 DIAGNOSIS — Z85.828 PERSONAL HISTORY OF OTHER MALIGNANT NEOPLASM OF SKIN: ICD-10-CM

## 2024-06-26 PROBLEM — D22.5 MELANOCYTIC NEVI OF TRUNK: Status: ACTIVE | Noted: 2024-06-26

## 2024-06-26 PROBLEM — D22.39 MELANOCYTIC NEVI OF OTHER PARTS OF FACE: Status: ACTIVE | Noted: 2024-06-26

## 2024-06-26 PROBLEM — D18.01 HEMANGIOMA OF SKIN AND SUBCUTANEOUS TISSUE: Status: ACTIVE | Noted: 2024-06-26

## 2024-06-26 PROBLEM — D22.72 MELANOCYTIC NEVI OF LEFT LOWER LIMB, INCLUDING HIP: Status: ACTIVE | Noted: 2024-06-26

## 2024-06-26 PROCEDURE — 99214 OFFICE O/P EST MOD 30 MIN: CPT

## 2024-06-26 PROCEDURE — OTHER COUNSELING: OTHER

## 2024-06-26 PROCEDURE — OTHER PRESCRIPTION: OTHER

## 2024-06-26 RX ORDER — CLOBETASOL PROPIONATE 0.5 MG/ML
SOLUTION TOPICAL
Qty: 50 | Refills: 3 | Status: ERX | COMMUNITY
Start: 2024-06-26

## 2024-06-26 RX ORDER — TRIAMCINOLONE ACETONIDE 1 MG/G
CREAM TOPICAL
Qty: 80 | Refills: 2 | Status: ERX | COMMUNITY
Start: 2024-06-26

## 2024-06-26 ASSESSMENT — LOCATION DETAILED DESCRIPTION DERM
LOCATION DETAILED: LEFT POSTERIOR ANKLE
LOCATION DETAILED: LEFT SUPERIOR UPPER BACK
LOCATION DETAILED: RIGHT CLAVICULAR NECK
LOCATION DETAILED: RIGHT SUPERIOR LATERAL MIDBACK
LOCATION DETAILED: RIGHT INFERIOR FOREHEAD
LOCATION DETAILED: EPIGASTRIC SKIN
LOCATION DETAILED: LEFT LATERAL ABDOMEN
LOCATION DETAILED: LEFT MEDIAL MALAR CHEEK
LOCATION DETAILED: LEFT SUPERIOR PARIETAL SCALP
LOCATION DETAILED: RIGHT BUTTOCK
LOCATION DETAILED: LEFT LATERAL FOREHEAD
LOCATION DETAILED: SUPERIOR LUMBAR SPINE
LOCATION DETAILED: RIGHT SUPERIOR UPPER BACK
LOCATION DETAILED: RIGHT MEDIAL MALAR CHEEK
LOCATION DETAILED: LEFT LATERAL MALAR CHEEK
LOCATION DETAILED: RIGHT CENTRAL MALAR CHEEK
LOCATION DETAILED: LEFT SUPERIOR FOREHEAD
LOCATION DETAILED: LEFT INFERIOR CENTRAL MALAR CHEEK
LOCATION DETAILED: RIGHT LATERAL ABDOMEN
LOCATION DETAILED: RIGHT MEDIAL BREAST 1-2:00 REGION
LOCATION DETAILED: LEFT PROXIMAL CALF
LOCATION DETAILED: RIGHT SUPERIOR CENTRAL MALAR CHEEK
LOCATION DETAILED: RIGHT MEDIAL SUPERIOR CHEST
LOCATION DETAILED: LEFT INFERIOR UPPER BACK
LOCATION DETAILED: LEFT DISTAL PRETIBIAL REGION
LOCATION DETAILED: LEFT INFERIOR LATERAL NECK
LOCATION DETAILED: LEFT FOREHEAD
LOCATION DETAILED: LEFT MID-UPPER BACK
LOCATION DETAILED: LEFT SUPERIOR CENTRAL MALAR CHEEK
LOCATION DETAILED: RIGHT INFERIOR FRONTAL SCALP
LOCATION DETAILED: RIGHT SUPERIOR ANTERIOR NECK
LOCATION DETAILED: NASAL DORSUM
LOCATION DETAILED: INFERIOR MID FOREHEAD
LOCATION DETAILED: LEFT DORSAL FOOT
LOCATION DETAILED: LEFT POSTERIOR SHOULDER
LOCATION DETAILED: LEFT INFERIOR LATERAL LOWER BACK
LOCATION DETAILED: LEFT INFERIOR LATERAL MIDBACK
LOCATION DETAILED: LEFT ANTERIOR PROXIMAL THIGH
LOCATION DETAILED: LEFT DISTAL POSTERIOR THIGH
LOCATION DETAILED: LEFT SUPERIOR LATERAL LOWER BACK
LOCATION DETAILED: RIGHT ANTERIOR PROXIMAL THIGH

## 2024-06-26 ASSESSMENT — LOCATION SIMPLE DESCRIPTION DERM
LOCATION SIMPLE: LEFT POSTERIOR THIGH
LOCATION SIMPLE: INFERIOR FOREHEAD
LOCATION SIMPLE: RIGHT BREAST
LOCATION SIMPLE: LEFT THIGH
LOCATION SIMPLE: RIGHT THIGH
LOCATION SIMPLE: CHEST
LOCATION SIMPLE: LOWER BACK
LOCATION SIMPLE: LEFT CALF
LOCATION SIMPLE: LEFT FOREHEAD
LOCATION SIMPLE: RIGHT ANTERIOR NECK
LOCATION SIMPLE: LEFT LOWER BACK
LOCATION SIMPLE: RIGHT FOREHEAD
LOCATION SIMPLE: LEFT ANTERIOR NECK
LOCATION SIMPLE: NOSE
LOCATION SIMPLE: RIGHT LOWER BACK
LOCATION SIMPLE: LEFT CHEEK
LOCATION SIMPLE: LEFT UPPER BACK
LOCATION SIMPLE: SCALP
LOCATION SIMPLE: ABDOMEN
LOCATION SIMPLE: LEFT FOOT
LOCATION SIMPLE: RIGHT BUTTOCK
LOCATION SIMPLE: LEFT ANKLE
LOCATION SIMPLE: LEFT PRETIBIAL REGION
LOCATION SIMPLE: RIGHT CHEEK
LOCATION SIMPLE: RIGHT UPPER BACK
LOCATION SIMPLE: LEFT SHOULDER

## 2024-06-26 ASSESSMENT — LOCATION ZONE DERM
LOCATION ZONE: LEG
LOCATION ZONE: TRUNK
LOCATION ZONE: NECK
LOCATION ZONE: SCALP
LOCATION ZONE: NOSE
LOCATION ZONE: FEET
LOCATION ZONE: ARM
LOCATION ZONE: FACE

## 2024-06-26 NOTE — HPI: FULL BODY SKIN EXAMINATION
What Is The Reason For Today's Visit?: Full Body Skin Examination
What Is The Reason For Today's Visit? (Being Monitored For X): the development of new lesions
Additional History: Rash hairline,

## 2024-09-12 ENCOUNTER — APPOINTMENT (OUTPATIENT)
Dept: URBAN - METROPOLITAN AREA CLINIC 199 | Age: 74
Setting detail: DERMATOLOGY
End: 2024-09-30

## 2024-09-12 DIAGNOSIS — L21.8 OTHER SEBORRHEIC DERMATITIS: ICD-10-CM

## 2024-09-12 PROCEDURE — OTHER COUNSELING: OTHER

## 2024-09-12 PROCEDURE — 99214 OFFICE O/P EST MOD 30 MIN: CPT

## 2024-09-12 PROCEDURE — OTHER ADDITIONAL NOTES: OTHER

## 2024-09-12 PROCEDURE — OTHER PRESCRIPTION MEDICATION MANAGEMENT: OTHER

## 2024-09-12 ASSESSMENT — LOCATION ZONE DERM
LOCATION ZONE: FACE
LOCATION ZONE: SCALP

## 2024-09-12 ASSESSMENT — LOCATION SIMPLE DESCRIPTION DERM
LOCATION SIMPLE: SCALP
LOCATION SIMPLE: LEFT FOREHEAD

## 2024-09-12 ASSESSMENT — LOCATION DETAILED DESCRIPTION DERM
LOCATION DETAILED: LEFT SUPERIOR PARIETAL SCALP
LOCATION DETAILED: LEFT SUPERIOR FOREHEAD
LOCATION DETAILED: RIGHT INFERIOR FRONTAL SCALP

## 2024-09-12 NOTE — PROCEDURE: ADDITIONAL NOTES
Detail Level: Simple
Additional Notes: Try rogaine once rash subsides to help hair growth
Render Risk Assessment In Note?: no

## 2024-09-12 NOTE — HPI: BODY LOCATION - SCALP
Additional History: Cream not dry it out as much, hair falling out. Flakiness, using them on aff, as needed

## 2024-09-12 NOTE — PROCEDURE: PRESCRIPTION MEDICATION MANAGEMENT
Detail Level: Zone
Plan: Has been using topical treatments very sparingly\\n-advised increasing use of topical steroid use. Advised BID x 2 weeks prn flares. She has clobetasol scalp solution and triam cream--can use whichever one she prefers
Render In Strict Bullet Format?: No
Continue Regimen: Increase Clobetasol scalp solution or Triam cream use on scalp

## 2024-10-01 NOTE — PROCEDURE: BIOPSY BY SHAVE METHOD
Call Center TCM Note      Flowsheet Row Responses   [facility] patient discharged from? [place]   Does the patient have one of the following disease processes/diagnoses(primary or secondary)? General Surgery   TCM attempt successful? No  [NO one on VR]   Unsuccessful attempts Attempt 3            Charlene Cali RN    10/1/2024, 09:25 EDT         X Size Of Lesion In Cm: 0

## 2024-12-04 ENCOUNTER — APPOINTMENT (OUTPATIENT)
Dept: URBAN - METROPOLITAN AREA CLINIC 199 | Age: 74
Setting detail: DERMATOLOGY
End: 2024-12-04

## 2024-12-04 DIAGNOSIS — L82.1 OTHER SEBORRHEIC KERATOSIS: ICD-10-CM

## 2024-12-04 DIAGNOSIS — Z85.828 PERSONAL HISTORY OF OTHER MALIGNANT NEOPLASM OF SKIN: ICD-10-CM

## 2024-12-04 DIAGNOSIS — D22 MELANOCYTIC NEVI: ICD-10-CM

## 2024-12-04 DIAGNOSIS — L21.8 OTHER SEBORRHEIC DERMATITIS: ICD-10-CM

## 2024-12-04 DIAGNOSIS — L81.4 OTHER MELANIN HYPERPIGMENTATION: ICD-10-CM

## 2024-12-04 DIAGNOSIS — D18.0 HEMANGIOMA: ICD-10-CM

## 2024-12-04 PROBLEM — D22.39 MELANOCYTIC NEVI OF OTHER PARTS OF FACE: Status: ACTIVE | Noted: 2024-12-04

## 2024-12-04 PROBLEM — D22.72 MELANOCYTIC NEVI OF LEFT LOWER LIMB, INCLUDING HIP: Status: ACTIVE | Noted: 2024-12-04

## 2024-12-04 PROBLEM — D18.01 HEMANGIOMA OF SKIN AND SUBCUTANEOUS TISSUE: Status: ACTIVE | Noted: 2024-12-04

## 2024-12-04 PROBLEM — D22.5 MELANOCYTIC NEVI OF TRUNK: Status: ACTIVE | Noted: 2024-12-04

## 2024-12-04 PROCEDURE — OTHER COUNSELING: OTHER

## 2024-12-04 PROCEDURE — OTHER PRESCRIPTION: OTHER

## 2024-12-04 PROCEDURE — 99214 OFFICE O/P EST MOD 30 MIN: CPT

## 2024-12-04 RX ORDER — CLOBETASOL PROPIONATE 0.5 MG/ML
SOLUTION TOPICAL
Qty: 50 | Refills: 5 | Status: ERX

## 2024-12-04 RX ORDER — KETOCONAZOLE 20 MG/ML
SHAMPOO, SUSPENSION TOPICAL BIW
Qty: 120 | Refills: 6 | Status: ERX | COMMUNITY
Start: 2024-12-04

## 2024-12-04 ASSESSMENT — LOCATION SIMPLE DESCRIPTION DERM
LOCATION SIMPLE: LEFT LOWER BACK
LOCATION SIMPLE: CHEST
LOCATION SIMPLE: LEFT FOOT
LOCATION SIMPLE: RIGHT FOREHEAD
LOCATION SIMPLE: LEFT SHOULDER
LOCATION SIMPLE: RIGHT THIGH
LOCATION SIMPLE: RIGHT LOWER BACK
LOCATION SIMPLE: LEFT THIGH
LOCATION SIMPLE: LEFT CALF
LOCATION SIMPLE: INFERIOR FOREHEAD
LOCATION SIMPLE: RIGHT UPPER BACK
LOCATION SIMPLE: LEFT CHEEK
LOCATION SIMPLE: RIGHT ANTERIOR NECK
LOCATION SIMPLE: RIGHT BREAST
LOCATION SIMPLE: LEFT POSTERIOR THIGH
LOCATION SIMPLE: RIGHT CHEEK
LOCATION SIMPLE: LEFT ANTERIOR NECK
LOCATION SIMPLE: LEFT UPPER BACK
LOCATION SIMPLE: SCALP
LOCATION SIMPLE: LEFT FOREHEAD
LOCATION SIMPLE: LOWER BACK
LOCATION SIMPLE: RIGHT BUTTOCK
LOCATION SIMPLE: ABDOMEN
LOCATION SIMPLE: LEFT ANKLE
LOCATION SIMPLE: NOSE
LOCATION SIMPLE: LEFT PRETIBIAL REGION

## 2024-12-04 ASSESSMENT — LOCATION DETAILED DESCRIPTION DERM
LOCATION DETAILED: RIGHT LATERAL ABDOMEN
LOCATION DETAILED: RIGHT CENTRAL MALAR CHEEK
LOCATION DETAILED: LEFT LATERAL FOREHEAD
LOCATION DETAILED: RIGHT ANTERIOR PROXIMAL THIGH
LOCATION DETAILED: LEFT DISTAL POSTERIOR THIGH
LOCATION DETAILED: RIGHT SUPERIOR ANTERIOR NECK
LOCATION DETAILED: LEFT DISTAL PRETIBIAL REGION
LOCATION DETAILED: RIGHT MEDIAL SUPERIOR CHEST
LOCATION DETAILED: LEFT SUPERIOR CENTRAL MALAR CHEEK
LOCATION DETAILED: RIGHT SUPERIOR LATERAL MIDBACK
LOCATION DETAILED: RIGHT INFERIOR FRONTAL SCALP
LOCATION DETAILED: LEFT ANTERIOR PROXIMAL THIGH
LOCATION DETAILED: LEFT INFERIOR LATERAL LOWER BACK
LOCATION DETAILED: LEFT INFERIOR LATERAL MIDBACK
LOCATION DETAILED: RIGHT BUTTOCK
LOCATION DETAILED: LEFT DORSAL FOOT
LOCATION DETAILED: LEFT SUPERIOR FOREHEAD
LOCATION DETAILED: INFERIOR MID FOREHEAD
LOCATION DETAILED: LEFT SUPERIOR PARIETAL SCALP
LOCATION DETAILED: LEFT LATERAL MALAR CHEEK
LOCATION DETAILED: LEFT SUPERIOR UPPER BACK
LOCATION DETAILED: LEFT FOREHEAD
LOCATION DETAILED: LEFT MEDIAL MALAR CHEEK
LOCATION DETAILED: LEFT POSTERIOR ANKLE
LOCATION DETAILED: LEFT INFERIOR CENTRAL MALAR CHEEK
LOCATION DETAILED: LEFT POSTERIOR SHOULDER
LOCATION DETAILED: RIGHT SUPERIOR UPPER BACK
LOCATION DETAILED: RIGHT MEDIAL BREAST 1-2:00 REGION
LOCATION DETAILED: LEFT MID-UPPER BACK
LOCATION DETAILED: RIGHT CLAVICULAR NECK
LOCATION DETAILED: RIGHT SUPERIOR CENTRAL MALAR CHEEK
LOCATION DETAILED: RIGHT INFERIOR FOREHEAD
LOCATION DETAILED: LEFT LATERAL ABDOMEN
LOCATION DETAILED: LEFT SUPERIOR LATERAL LOWER BACK
LOCATION DETAILED: LEFT PROXIMAL CALF
LOCATION DETAILED: NASAL DORSUM
LOCATION DETAILED: EPIGASTRIC SKIN
LOCATION DETAILED: SUPERIOR LUMBAR SPINE
LOCATION DETAILED: LEFT INFERIOR LATERAL NECK
LOCATION DETAILED: LEFT INFERIOR UPPER BACK
LOCATION DETAILED: RIGHT MEDIAL MALAR CHEEK

## 2024-12-04 ASSESSMENT — LOCATION ZONE DERM
LOCATION ZONE: SCALP
LOCATION ZONE: NECK
LOCATION ZONE: ARM
LOCATION ZONE: NOSE
LOCATION ZONE: TRUNK
LOCATION ZONE: LEG
LOCATION ZONE: FEET
LOCATION ZONE: FACE

## 2025-06-04 ENCOUNTER — APPOINTMENT (OUTPATIENT)
Dept: URBAN - METROPOLITAN AREA CLINIC 199 | Age: 75
Setting detail: DERMATOLOGY
End: 2025-06-04

## 2025-06-04 DIAGNOSIS — L82.1 OTHER SEBORRHEIC KERATOSIS: ICD-10-CM

## 2025-06-04 DIAGNOSIS — L81.4 OTHER MELANIN HYPERPIGMENTATION: ICD-10-CM

## 2025-06-04 DIAGNOSIS — L57.0 ACTINIC KERATOSIS: ICD-10-CM

## 2025-06-04 DIAGNOSIS — Z85.828 PERSONAL HISTORY OF OTHER MALIGNANT NEOPLASM OF SKIN: ICD-10-CM

## 2025-06-04 DIAGNOSIS — D22 MELANOCYTIC NEVI: ICD-10-CM

## 2025-06-04 DIAGNOSIS — L30.4 ERYTHEMA INTERTRIGO: ICD-10-CM

## 2025-06-04 DIAGNOSIS — D18.0 HEMANGIOMA: ICD-10-CM

## 2025-06-04 PROBLEM — D22.5 MELANOCYTIC NEVI OF TRUNK: Status: ACTIVE | Noted: 2025-06-04

## 2025-06-04 PROBLEM — D22.72 MELANOCYTIC NEVI OF LEFT LOWER LIMB, INCLUDING HIP: Status: ACTIVE | Noted: 2025-06-04

## 2025-06-04 PROBLEM — D22.71 MELANOCYTIC NEVI OF RIGHT LOWER LIMB, INCLUDING HIP: Status: ACTIVE | Noted: 2025-06-04

## 2025-06-04 PROBLEM — D22.39 MELANOCYTIC NEVI OF OTHER PARTS OF FACE: Status: ACTIVE | Noted: 2025-06-04

## 2025-06-04 PROBLEM — D18.01 HEMANGIOMA OF SKIN AND SUBCUTANEOUS TISSUE: Status: ACTIVE | Noted: 2025-06-04

## 2025-06-04 PROCEDURE — OTHER PRESCRIPTION: OTHER

## 2025-06-04 PROCEDURE — 99213 OFFICE O/P EST LOW 20 MIN: CPT | Mod: 25

## 2025-06-04 PROCEDURE — OTHER COUNSELING: OTHER

## 2025-06-04 PROCEDURE — OTHER LIQUID NITROGEN: OTHER

## 2025-06-04 PROCEDURE — 17000 DESTRUCT PREMALG LESION: CPT

## 2025-06-04 RX ORDER — NYSTATIN 100000 U/G
CREAM TOPICAL
Qty: 30 | Refills: 4 | Status: ERX | COMMUNITY
Start: 2025-06-04

## 2025-06-04 RX ORDER — NYSTATIN 100000 1/G
POWDER TOPICAL TID
Qty: 60 | Refills: 3 | Status: ERX | COMMUNITY
Start: 2025-06-04

## 2025-06-04 ASSESSMENT — LOCATION DETAILED DESCRIPTION DERM
LOCATION DETAILED: LEFT INFERIOR LATERAL MIDBACK
LOCATION DETAILED: RIGHT ANTERIOR PROXIMAL THIGH
LOCATION DETAILED: RIGHT MEDIAL MALAR CHEEK
LOCATION DETAILED: RIGHT SUPERIOR ANTERIOR NECK
LOCATION DETAILED: LEFT SUPERIOR LATERAL LOWER BACK
LOCATION DETAILED: RIGHT CENTRAL MALAR CHEEK
LOCATION DETAILED: LEFT DISTAL POSTERIOR THIGH
LOCATION DETAILED: LEFT DISTAL PRETIBIAL REGION
LOCATION DETAILED: LEFT LATERAL ABDOMEN
LOCATION DETAILED: RIGHT SUPERIOR UPPER BACK
LOCATION DETAILED: LEFT MEDIAL MALAR CHEEK
LOCATION DETAILED: LEFT DORSAL FOOT
LOCATION DETAILED: LEFT SUPERIOR CENTRAL MALAR CHEEK
LOCATION DETAILED: RIGHT CLAVICULAR NECK
LOCATION DETAILED: RIGHT BUTTOCK
LOCATION DETAILED: LEFT LATERAL FOREHEAD
LOCATION DETAILED: EPIGASTRIC SKIN
LOCATION DETAILED: LEFT PROXIMAL CALF
LOCATION DETAILED: LEFT INFERIOR LATERAL NECK
LOCATION DETAILED: LEFT MID-UPPER BACK
LOCATION DETAILED: LEFT INFERIOR CENTRAL MALAR CHEEK
LOCATION DETAILED: LEFT LATERAL MALAR CHEEK
LOCATION DETAILED: RIGHT MEDIAL BREAST 1-2:00 REGION
LOCATION DETAILED: LEFT POSTERIOR ANKLE
LOCATION DETAILED: RIGHT SUPERIOR CENTRAL MALAR CHEEK
LOCATION DETAILED: LEFT SUPERIOR UPPER BACK
LOCATION DETAILED: RIGHT NIPPLE
LOCATION DETAILED: RIGHT LATERAL ABDOMEN
LOCATION DETAILED: RIGHT MEDIAL BREAST 4-5:00 REGION
LOCATION DETAILED: INFERIOR MID FOREHEAD
LOCATION DETAILED: LEFT POSTERIOR SHOULDER
LOCATION DETAILED: RIGHT MEDIAL SUPERIOR CHEST
LOCATION DETAILED: LEFT FOREHEAD
LOCATION DETAILED: RIGHT SUPERIOR LATERAL MIDBACK
LOCATION DETAILED: RIGHT DISTAL LATERAL POSTERIOR THIGH
LOCATION DETAILED: RIGHT INFERIOR FOREHEAD
LOCATION DETAILED: LEFT DISTAL CALF
LOCATION DETAILED: NASAL DORSUM
LOCATION DETAILED: LEFT INFERIOR UPPER BACK
LOCATION DETAILED: LEFT MEDIAL BREAST 9-10:00 REGION
LOCATION DETAILED: LEFT INFERIOR LATERAL LOWER BACK
LOCATION DETAILED: SUPERIOR LUMBAR SPINE
LOCATION DETAILED: LEFT ANTERIOR PROXIMAL THIGH
LOCATION DETAILED: LEFT MEDIAL BREAST 6-7:00 REGION

## 2025-06-04 ASSESSMENT — LOCATION ZONE DERM
LOCATION ZONE: ARM
LOCATION ZONE: LEG
LOCATION ZONE: NOSE
LOCATION ZONE: FACE
LOCATION ZONE: TRUNK
LOCATION ZONE: FEET
LOCATION ZONE: NECK

## 2025-06-04 ASSESSMENT — LOCATION SIMPLE DESCRIPTION DERM
LOCATION SIMPLE: LOWER BACK
LOCATION SIMPLE: RIGHT ANTERIOR NECK
LOCATION SIMPLE: NOSE
LOCATION SIMPLE: ABDOMEN
LOCATION SIMPLE: RIGHT FOREHEAD
LOCATION SIMPLE: CHEST
LOCATION SIMPLE: RIGHT UPPER BACK
LOCATION SIMPLE: RIGHT POSTERIOR THIGH
LOCATION SIMPLE: RIGHT CHEEK
LOCATION SIMPLE: RIGHT BUTTOCK
LOCATION SIMPLE: LEFT PRETIBIAL REGION
LOCATION SIMPLE: LEFT THIGH
LOCATION SIMPLE: LEFT SHOULDER
LOCATION SIMPLE: LEFT CALF
LOCATION SIMPLE: INFERIOR FOREHEAD
LOCATION SIMPLE: LEFT LOWER BACK
LOCATION SIMPLE: RIGHT LOWER BACK
LOCATION SIMPLE: LEFT UPPER BACK
LOCATION SIMPLE: LEFT FOOT
LOCATION SIMPLE: LEFT CHEEK
LOCATION SIMPLE: LEFT ANKLE
LOCATION SIMPLE: RIGHT THIGH
LOCATION SIMPLE: RIGHT BREAST
LOCATION SIMPLE: LEFT BREAST
LOCATION SIMPLE: LEFT POSTERIOR THIGH
LOCATION SIMPLE: LEFT FOREHEAD
LOCATION SIMPLE: LEFT ANTERIOR NECK